# Patient Record
Sex: MALE | Race: AMERICAN INDIAN OR ALASKA NATIVE
[De-identification: names, ages, dates, MRNs, and addresses within clinical notes are randomized per-mention and may not be internally consistent; named-entity substitution may affect disease eponyms.]

---

## 2019-09-05 ENCOUNTER — HOSPITAL ENCOUNTER (EMERGENCY)
Dept: HOSPITAL 94 - ER | Age: 49
Discharge: HOME | End: 2019-09-05
Payer: MEDICAID

## 2019-09-05 VITALS — WEIGHT: 191.8 LBS | HEIGHT: 69 IN | BODY MASS INDEX: 28.41 KG/M2

## 2019-09-05 VITALS — DIASTOLIC BLOOD PRESSURE: 106 MMHG | SYSTOLIC BLOOD PRESSURE: 148 MMHG

## 2019-09-05 DIAGNOSIS — F17.200: ICD-10-CM

## 2019-09-05 DIAGNOSIS — Z79.4: ICD-10-CM

## 2019-09-05 DIAGNOSIS — M54.2: Primary | ICD-10-CM

## 2019-09-05 DIAGNOSIS — G89.29: ICD-10-CM

## 2019-09-05 DIAGNOSIS — Z88.8: ICD-10-CM

## 2019-09-05 DIAGNOSIS — Z88.1: ICD-10-CM

## 2019-09-05 DIAGNOSIS — E11.42: ICD-10-CM

## 2019-09-05 LAB
ALBUMIN SERPL BCP-MCNC: 3.6 G/DL (ref 3.4–5)
ALBUMIN/GLOB SERPL: 1.1 {RATIO} (ref 1.1–1.5)
ALP SERPL-CCNC: 74 IU/L (ref 46–116)
ALT SERPL W P-5'-P-CCNC: 68 U/L (ref 12–78)
ANION GAP SERPL CALCULATED.3IONS-SCNC: 9 MMOL/L (ref 8–16)
APTT PPP: 22 SECONDS (ref 22–32)
AST SERPL W P-5'-P-CCNC: 53 U/L (ref 10–37)
BASOPHILS # BLD AUTO: 0 X10'3 (ref 0–0.2)
BASOPHILS NFR BLD AUTO: 0.4 % (ref 0–1)
BILIRUB SERPL-MCNC: 0.1 MG/DL (ref 0.1–1)
BUN SERPL-MCNC: 11 MG/DL (ref 7–18)
BUN/CREAT SERPL: 10.4 (ref 5.4–32)
CALCIUM SERPL-MCNC: 8.5 MG/DL (ref 8.5–10.1)
CHLORIDE SERPL-SCNC: 105 MMOL/L (ref 99–107)
CO2 SERPL-SCNC: 27.2 MMOL/L (ref 24–32)
CREAT SERPL-MCNC: 1.06 MG/DL (ref 0.6–1.1)
EOSINOPHIL # BLD AUTO: 0.2 X10'3 (ref 0–0.9)
EOSINOPHIL NFR BLD AUTO: 3.5 % (ref 0–6)
ERYTHROCYTE [DISTWIDTH] IN BLOOD BY AUTOMATED COUNT: 16.4 % (ref 11.5–14.5)
GFR SERPL CREATININE-BSD FRML MDRD: 74 ML/MIN
GLUCOSE SERPL-MCNC: 126 MG/DL (ref 70–104)
HCT VFR BLD AUTO: 32.4 % (ref 42–52)
HGB BLD-MCNC: 10.8 G/DL (ref 14–17.9)
LYMPHOCYTES # BLD AUTO: 1.8 X10'3 (ref 1.1–4.8)
LYMPHOCYTES NFR BLD AUTO: 30.8 % (ref 21–51)
MCH RBC QN AUTO: 30.1 PG (ref 27–31)
MCHC RBC AUTO-ENTMCNC: 33.2 G/DL (ref 33–36.5)
MCV RBC AUTO: 90.6 FL (ref 78–98)
MONOCYTES # BLD AUTO: 0.8 X10'3 (ref 0–0.9)
MONOCYTES NFR BLD AUTO: 14 % (ref 2–12)
NEUTROPHILS # BLD AUTO: 3 X10'3 (ref 1.8–7.7)
NEUTROPHILS NFR BLD AUTO: 51.3 % (ref 42–75)
PLATELET # BLD AUTO: 154 X10'3 (ref 140–440)
PMV BLD AUTO: 8.6 FL (ref 7.4–10.4)
POTASSIUM SERPL-SCNC: 3.8 MMOL/L (ref 3.5–5.1)
PROT SERPL-MCNC: 7 G/DL (ref 6.4–8.2)
RBC # BLD AUTO: 3.57 X10'6 (ref 4.7–6.1)
SODIUM SERPL-SCNC: 141 MMOL/L (ref 135–145)
WBC # BLD AUTO: 5.9 X10'3 (ref 4.5–11)

## 2019-09-05 PROCEDURE — 96374 THER/PROPH/DIAG INJ IV PUSH: CPT

## 2019-09-05 PROCEDURE — 99284 EMERGENCY DEPT VISIT MOD MDM: CPT

## 2019-09-05 PROCEDURE — 96376 TX/PRO/DX INJ SAME DRUG ADON: CPT

## 2019-09-05 PROCEDURE — 85610 PROTHROMBIN TIME: CPT

## 2019-09-05 PROCEDURE — 85730 THROMBOPLASTIN TIME PARTIAL: CPT

## 2019-09-05 PROCEDURE — 83605 ASSAY OF LACTIC ACID: CPT

## 2019-09-05 PROCEDURE — 87040 BLOOD CULTURE FOR BACTERIA: CPT

## 2019-09-05 PROCEDURE — 93880 EXTRACRANIAL BILAT STUDY: CPT

## 2019-09-05 PROCEDURE — 80053 COMPREHEN METABOLIC PANEL: CPT

## 2019-09-05 PROCEDURE — 85025 COMPLETE CBC W/AUTO DIFF WBC: CPT

## 2019-09-05 PROCEDURE — 70491 CT SOFT TISSUE NECK W/DYE: CPT

## 2019-09-05 PROCEDURE — 96375 TX/PRO/DX INJ NEW DRUG ADDON: CPT

## 2019-09-05 PROCEDURE — 36415 COLL VENOUS BLD VENIPUNCTURE: CPT

## 2019-09-05 PROCEDURE — 70450 CT HEAD/BRAIN W/O DYE: CPT

## 2019-09-13 ENCOUNTER — HOSPITAL ENCOUNTER (EMERGENCY)
Dept: HOSPITAL 94 - ER | Age: 49
Discharge: HOME | End: 2019-09-13
Payer: MEDICAID

## 2019-09-13 VITALS — HEIGHT: 69 IN | BODY MASS INDEX: 28.94 KG/M2 | WEIGHT: 195.42 LBS

## 2019-09-13 VITALS — SYSTOLIC BLOOD PRESSURE: 157 MMHG | DIASTOLIC BLOOD PRESSURE: 103 MMHG

## 2019-09-13 DIAGNOSIS — M54.5: ICD-10-CM

## 2019-09-13 DIAGNOSIS — E11.42: ICD-10-CM

## 2019-09-13 DIAGNOSIS — Z88.1: ICD-10-CM

## 2019-09-13 DIAGNOSIS — G43.909: Primary | ICD-10-CM

## 2019-09-13 DIAGNOSIS — Z86.73: ICD-10-CM

## 2019-09-13 DIAGNOSIS — G89.29: ICD-10-CM

## 2019-09-13 DIAGNOSIS — Z88.8: ICD-10-CM

## 2019-09-13 LAB
ALBUMIN SERPL BCP-MCNC: 3.3 G/DL (ref 3.4–5)
ALBUMIN/GLOB SERPL: 1 {RATIO} (ref 1.1–1.5)
ALP SERPL-CCNC: 65 IU/L (ref 46–116)
ALT SERPL W P-5'-P-CCNC: 53 U/L (ref 12–78)
ANION GAP SERPL CALCULATED.3IONS-SCNC: 7 MMOL/L (ref 8–16)
AST SERPL W P-5'-P-CCNC: 23 U/L (ref 10–37)
BACTERIA URNS QL MICRO: (no result) /HPF
BASOPHILS # BLD AUTO: 0.1 X10'3 (ref 0–0.2)
BASOPHILS NFR BLD AUTO: 0.9 % (ref 0–1)
BILIRUB SERPL-MCNC: 0.2 MG/DL (ref 0.1–1)
BUN SERPL-MCNC: 13 MG/DL (ref 7–18)
BUN/CREAT SERPL: 14.1 (ref 5.4–32)
CALCIUM SERPL-MCNC: 8.5 MG/DL (ref 8.5–10.1)
CHLORIDE SERPL-SCNC: 107 MMOL/L (ref 99–107)
CLARITY UR: CLEAR
CO2 SERPL-SCNC: 25.9 MMOL/L (ref 24–32)
COLOR UR: YELLOW
CREAT SERPL-MCNC: 0.92 MG/DL (ref 0.6–1.1)
DEPRECATED SQUAMOUS URNS QL MICRO: (no result) /LPF
EOSINOPHIL # BLD AUTO: 0.2 X10'3 (ref 0–0.9)
EOSINOPHIL NFR BLD AUTO: 2.7 % (ref 0–6)
ERYTHROCYTE [DISTWIDTH] IN BLOOD BY AUTOMATED COUNT: 16.2 % (ref 11.5–14.5)
ETHANOL SERPL-MCNC: < 0.01 GM/DL (ref 0–0.01)
GFR SERPL CREATININE-BSD FRML MDRD: 87 ML/MIN
GLUCOSE SERPL-MCNC: 245 MG/DL (ref 70–104)
GLUCOSE UR STRIP-MCNC: >=1000 MG/DL
HCT VFR BLD AUTO: 31.1 % (ref 42–52)
HGB BLD-MCNC: 10.2 G/DL (ref 14–17.9)
HGB UR QL STRIP: NEGATIVE
KETONES UR STRIP-MCNC: NEGATIVE MG/DL
LEUKOCYTE ESTERASE UR QL STRIP: NEGATIVE
LYMPHOCYTES # BLD AUTO: 1.2 X10'3 (ref 1.1–4.8)
LYMPHOCYTES NFR BLD AUTO: 18 % (ref 21–51)
MAGNESIUM SERPL-MCNC: 1.7 MG/DL (ref 1.5–2.4)
MCH RBC QN AUTO: 29.6 PG (ref 27–31)
MCHC RBC AUTO-ENTMCNC: 32.9 G/DL (ref 33–36.5)
MCV RBC AUTO: 90 FL (ref 78–98)
MONOCYTES # BLD AUTO: 0.6 X10'3 (ref 0–0.9)
MONOCYTES NFR BLD AUTO: 9.8 % (ref 2–12)
MUCOUS THREADS URNS QL MICRO: (no result) /LPF
NEUTROPHILS # BLD AUTO: 4.5 X10'3 (ref 1.8–7.7)
NEUTROPHILS NFR BLD AUTO: 68.6 % (ref 42–75)
NITRITE UR QL STRIP: NEGATIVE
PH UR STRIP: 6 [PH] (ref 4.8–8)
PHOSPHATE SERPL-MCNC: 3.7 MG/DL (ref 2.3–4.5)
PLATELET # BLD AUTO: 313 X10'3 (ref 140–440)
PMV BLD AUTO: 8.2 FL (ref 7.4–10.4)
POTASSIUM SERPL-SCNC: 4.5 MMOL/L (ref 3.5–5.1)
PROT SERPL-MCNC: 6.5 G/DL (ref 6.4–8.2)
PROT UR QL STRIP: NEGATIVE MG/DL
RBC # BLD AUTO: 3.46 X10'6 (ref 4.7–6.1)
RBC #/AREA URNS HPF: (no result) /HPF (ref 0–2)
SODIUM SERPL-SCNC: 140 MMOL/L (ref 135–145)
SP GR UR STRIP: <=1.005 (ref 1–1.03)
URN COLLECT METHOD CLASS: (no result)
UROBILINOGEN UR STRIP-MCNC: 0.2 E.U/DL (ref 0.2–1)
WBC # BLD AUTO: 6.6 X10'3 (ref 4.5–11)
WBC #/AREA URNS HPF: (no result) /HPF (ref 0–4)

## 2019-09-13 PROCEDURE — 99284 EMERGENCY DEPT VISIT MOD MDM: CPT

## 2019-09-13 PROCEDURE — 80320 DRUG SCREEN QUANTALCOHOLS: CPT

## 2019-09-13 PROCEDURE — 82948 REAGENT STRIP/BLOOD GLUCOSE: CPT

## 2019-09-13 PROCEDURE — 85025 COMPLETE CBC W/AUTO DIFF WBC: CPT

## 2019-09-13 PROCEDURE — 81001 URINALYSIS AUTO W/SCOPE: CPT

## 2019-09-13 PROCEDURE — 83735 ASSAY OF MAGNESIUM: CPT

## 2019-09-13 PROCEDURE — 84100 ASSAY OF PHOSPHORUS: CPT

## 2019-09-13 PROCEDURE — 71045 X-RAY EXAM CHEST 1 VIEW: CPT

## 2019-09-13 PROCEDURE — 84484 ASSAY OF TROPONIN QUANT: CPT

## 2019-09-13 PROCEDURE — 70450 CT HEAD/BRAIN W/O DYE: CPT

## 2019-09-13 PROCEDURE — 36415 COLL VENOUS BLD VENIPUNCTURE: CPT

## 2019-09-13 PROCEDURE — 85610 PROTHROMBIN TIME: CPT

## 2019-09-13 PROCEDURE — 96375 TX/PRO/DX INJ NEW DRUG ADDON: CPT

## 2019-09-13 PROCEDURE — 93005 ELECTROCARDIOGRAM TRACING: CPT

## 2019-09-13 PROCEDURE — 80053 COMPREHEN METABOLIC PANEL: CPT

## 2019-09-13 PROCEDURE — 96374 THER/PROPH/DIAG INJ IV PUSH: CPT

## 2019-09-18 ENCOUNTER — HOSPITAL ENCOUNTER (EMERGENCY)
Dept: HOSPITAL 94 - ER | Age: 49
Discharge: HOME | End: 2019-09-18
Payer: MEDICAID

## 2019-09-18 VITALS — HEIGHT: 69 IN | WEIGHT: 195.42 LBS | BODY MASS INDEX: 28.94 KG/M2

## 2019-09-18 VITALS — SYSTOLIC BLOOD PRESSURE: 169 MMHG | DIASTOLIC BLOOD PRESSURE: 86 MMHG

## 2019-09-18 DIAGNOSIS — Z79.899: ICD-10-CM

## 2019-09-18 DIAGNOSIS — Z88.1: ICD-10-CM

## 2019-09-18 DIAGNOSIS — G89.29: Primary | ICD-10-CM

## 2019-09-18 DIAGNOSIS — G62.9: ICD-10-CM

## 2019-09-18 DIAGNOSIS — Z86.73: ICD-10-CM

## 2019-09-18 DIAGNOSIS — M54.2: ICD-10-CM

## 2019-09-18 DIAGNOSIS — E10.65: ICD-10-CM

## 2019-09-18 DIAGNOSIS — Z79.4: ICD-10-CM

## 2019-09-18 LAB
ALBUMIN SERPL BCP-MCNC: 3.2 G/DL (ref 3.4–5)
ALBUMIN/GLOB SERPL: 1 {RATIO} (ref 1.1–1.5)
ALP SERPL-CCNC: 90 IU/L (ref 46–116)
ALT SERPL W P-5'-P-CCNC: 60 U/L (ref 12–78)
ANION GAP SERPL CALCULATED.3IONS-SCNC: 6 MMOL/L (ref 8–16)
AST SERPL W P-5'-P-CCNC: 23 U/L (ref 10–37)
BACTERIA URNS QL MICRO: (no result) /HPF
BASE EXCESS BLDA CALC-SCNC: 1.3 MMOL/L (ref -2–3)
BASOPHILS # BLD AUTO: 0 X10'3 (ref 0–0.2)
BASOPHILS NFR BLD AUTO: 0.2 % (ref 0–1)
BILIRUB SERPL-MCNC: 0.2 MG/DL (ref 0.1–1)
BODY TEMPERATURE: 38.2
BUN SERPL-MCNC: 13 MG/DL (ref 7–18)
BUN/CREAT SERPL: 11.1 (ref 5.4–32)
CALCIUM SERPL-MCNC: 8.2 MG/DL (ref 8.5–10.1)
CHLORIDE SERPL-SCNC: 102 MMOL/L (ref 99–107)
CLARITY UR: CLEAR
CO2 SERPL-SCNC: 29 MMOL/L (ref 24–32)
COHGB MFR BLDA: 1.5 % (ref 0.5–1.5)
COLOR UR: YELLOW
CREAT SERPL-MCNC: 1.17 MG/DL (ref 0.6–1.1)
DEPRECATED SQUAMOUS URNS QL MICRO: (no result) /LPF
EOSINOPHIL # BLD AUTO: 0.4 X10'3 (ref 0–0.9)
EOSINOPHIL NFR BLD AUTO: 3.3 % (ref 0–6)
ERYTHROCYTE [DISTWIDTH] IN BLOOD BY AUTOMATED COUNT: 15.8 % (ref 11.5–14.5)
GFR SERPL CREATININE-BSD FRML MDRD: 66 ML/MIN
GLUCOSE SERPL-MCNC: 359 MG/DL (ref 70–104)
GLUCOSE UR STRIP-MCNC: >=1000 MG/DL
HCO3 BLDA-SCNC: 25.2 MMOL/L (ref 22–26)
HCT VFR BLD AUTO: 30 % (ref 42–52)
HGB BLD-MCNC: 9.9 G/DL (ref 14–17.9)
HGB BLDA-MCNC: 10.6 G/DL (ref 14–17.9)
HGB UR QL STRIP: NEGATIVE
KETONES UR STRIP-MCNC: NEGATIVE MG/DL
LEUKOCYTE ESTERASE UR QL STRIP: NEGATIVE
LYMPHOCYTES # BLD AUTO: 1.2 X10'3 (ref 1.1–4.8)
LYMPHOCYTES NFR BLD AUTO: 11 % (ref 21–51)
MAGNESIUM SERPL-MCNC: 1.8 MG/DL (ref 1.5–2.4)
MCH RBC QN AUTO: 29.3 PG (ref 27–31)
MCHC RBC AUTO-ENTMCNC: 32.8 G/DL (ref 33–36.5)
MCV RBC AUTO: 89.1 FL (ref 78–98)
METHGB MFR BLDA: 0.2 % (ref 0.3–1.12)
MONOCYTES # BLD AUTO: 1.2 X10'3 (ref 0–0.9)
MONOCYTES NFR BLD AUTO: 11.3 % (ref 2–12)
MUCOUS THREADS URNS QL MICRO: (no result) /LPF
NEUTROPHILS # BLD AUTO: 8.1 X10'3 (ref 1.8–7.7)
NEUTROPHILS NFR BLD AUTO: 74.2 % (ref 42–75)
NITRITE UR QL STRIP: NEGATIVE
O2/TOTAL GAS SETTING VFR VENT: 21 MMHG/%
OXYHGB MFR BLDA: 93 % (ref 94–100)
PCO2 TEMP ADJ BLDA: 39.3 MMHG (ref 35–45)
PH TEMP ADJ BLDA: 7.43 [PH] (ref 7.35–7.45)
PH UR STRIP: 7 [PH] (ref 4.8–8)
PLATELET # BLD AUTO: 279 X10'3 (ref 140–440)
PMV BLD AUTO: 8.6 FL (ref 7.4–10.4)
PO2 TEMP ADJ BLD: 81.7 MMHG (ref 83–108)
POTASSIUM SERPL-SCNC: 3.9 MMOL/L (ref 3.5–5.1)
PROT SERPL-MCNC: 6.5 G/DL (ref 6.4–8.2)
PROT UR QL STRIP: NEGATIVE MG/DL
RBC # BLD AUTO: 3.37 X10'6 (ref 4.7–6.1)
RBC #/AREA URNS HPF: (no result) /HPF (ref 0–2)
SAO2 % BLDA: 94.6 % (ref 95–98)
SODIUM SERPL-SCNC: 137 MMOL/L (ref 135–145)
SP GR UR STRIP: <=1.005 (ref 1–1.03)
URN COLLECT METHOD CLASS: (no result)
UROBILINOGEN UR STRIP-MCNC: 0.2 E.U/DL (ref 0.2–1)
WBC # BLD AUTO: 10.9 X10'3 (ref 4.5–11)
WBC #/AREA URNS HPF: (no result) /HPF (ref 0–4)

## 2019-09-18 PROCEDURE — 82803 BLOOD GASES ANY COMBINATION: CPT

## 2019-09-18 PROCEDURE — 84145 PROCALCITONIN (PCT): CPT

## 2019-09-18 PROCEDURE — 83735 ASSAY OF MAGNESIUM: CPT

## 2019-09-18 PROCEDURE — 83605 ASSAY OF LACTIC ACID: CPT

## 2019-09-18 PROCEDURE — 99284 EMERGENCY DEPT VISIT MOD MDM: CPT

## 2019-09-18 PROCEDURE — 93005 ELECTROCARDIOGRAM TRACING: CPT

## 2019-09-18 PROCEDURE — 71045 X-RAY EXAM CHEST 1 VIEW: CPT

## 2019-09-18 PROCEDURE — 87040 BLOOD CULTURE FOR BACTERIA: CPT

## 2019-09-18 PROCEDURE — 36600 WITHDRAWAL OF ARTERIAL BLOOD: CPT

## 2019-09-18 PROCEDURE — 85018 HEMOGLOBIN: CPT

## 2019-09-18 PROCEDURE — 81001 URINALYSIS AUTO W/SCOPE: CPT

## 2019-09-18 PROCEDURE — 36415 COLL VENOUS BLD VENIPUNCTURE: CPT

## 2019-09-18 PROCEDURE — 85025 COMPLETE CBC W/AUTO DIFF WBC: CPT

## 2019-09-18 PROCEDURE — 80053 COMPREHEN METABOLIC PANEL: CPT

## 2019-09-18 PROCEDURE — 82948 REAGENT STRIP/BLOOD GLUCOSE: CPT

## 2019-09-18 NOTE — NUR
PT BECAME AGITATED AFTER TALKING WITH MD URIARTE. PT BEGAN YELLING AND 
DEMANDING "TAKE THIS IV OUT NOW. I'M LEAVING. I'VE NEVER BEEN SO DISRESPECTED 
IN MY LIFE." PT THEN PULLED IV OUT FROM HAND ON HIS OWN. SECURITY CALLED AND 
STANDBY AT BEDSIDE. PT DEMANDED BLOOD SUGAR BE TAKEN BEFORE HE "WALKS OUT." BS 
280 AND MD AWARE. PT AWARE AND INSISTED ON LEAVING AMA.

## 2019-09-19 ENCOUNTER — HOSPITAL ENCOUNTER (INPATIENT)
Dept: HOSPITAL 94 - ER | Age: 49
LOS: 3 days | Discharge: HOME | DRG: 721 | End: 2019-09-22
Attending: INTERNAL MEDICINE | Admitting: INTERNAL MEDICINE
Payer: MEDICAID

## 2019-09-19 VITALS — BODY MASS INDEX: 28.94 KG/M2 | WEIGHT: 195.42 LBS | HEIGHT: 69 IN

## 2019-09-19 VITALS — DIASTOLIC BLOOD PRESSURE: 77 MMHG | SYSTOLIC BLOOD PRESSURE: 123 MMHG

## 2019-09-19 DIAGNOSIS — Z86.73: ICD-10-CM

## 2019-09-19 DIAGNOSIS — I69.951: ICD-10-CM

## 2019-09-19 DIAGNOSIS — E78.5: ICD-10-CM

## 2019-09-19 DIAGNOSIS — M54.9: ICD-10-CM

## 2019-09-19 DIAGNOSIS — Z79.4: ICD-10-CM
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
## 2019-09-19 DIAGNOSIS — G89.29: ICD-10-CM

## 2019-09-19 DIAGNOSIS — F17.290: ICD-10-CM

## 2019-09-19 DIAGNOSIS — I10: ICD-10-CM

## 2019-09-19 DIAGNOSIS — E11.65: ICD-10-CM

## 2019-09-19 DIAGNOSIS — T81.41XA: Primary | ICD-10-CM

## 2019-09-19 DIAGNOSIS — R13.10: ICD-10-CM

## 2019-09-19 DIAGNOSIS — Z88.0: ICD-10-CM

## 2019-09-19 DIAGNOSIS — E11.42: ICD-10-CM

## 2019-09-19 DIAGNOSIS — G40.909: ICD-10-CM
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
## 2019-09-19 DIAGNOSIS — Z88.8: ICD-10-CM

## 2019-09-19 DIAGNOSIS — L03.221: ICD-10-CM

## 2019-09-19 LAB
ALBUMIN SERPL BCP-MCNC: 3.1 G/DL (ref 3.4–5)
ALBUMIN/GLOB SERPL: 0.9 {RATIO} (ref 1.1–1.5)
ALP SERPL-CCNC: 83 IU/L (ref 46–116)
ALT SERPL W P-5'-P-CCNC: 52 U/L (ref 12–78)
ANION GAP SERPL CALCULATED.3IONS-SCNC: 9 MMOL/L (ref 8–16)
AST SERPL W P-5'-P-CCNC: 14 U/L (ref 10–37)
BASOPHILS # BLD AUTO: 0 X10'3 (ref 0–0.2)
BASOPHILS NFR BLD AUTO: 0.3 % (ref 0–1)
BILIRUB SERPL-MCNC: 0.2 MG/DL (ref 0.1–1)
BUN SERPL-MCNC: 13 MG/DL (ref 7–18)
BUN/CREAT SERPL: 12.4 (ref 5.4–32)
CALCIUM SERPL-MCNC: 8.2 MG/DL (ref 8.5–10.1)
CHLORIDE SERPL-SCNC: 104 MMOL/L (ref 99–107)
CO2 SERPL-SCNC: 25.9 MMOL/L (ref 24–32)
CREAT SERPL-MCNC: 1.05 MG/DL (ref 0.6–1.1)
EOSINOPHIL # BLD AUTO: 0.3 X10'3 (ref 0–0.9)
EOSINOPHIL NFR BLD AUTO: 3.6 % (ref 0–6)
ERYTHROCYTE [DISTWIDTH] IN BLOOD BY AUTOMATED COUNT: 16.2 % (ref 11.5–14.5)
GFR SERPL CREATININE-BSD FRML MDRD: 74 ML/MIN
GLUCOSE SERPL-MCNC: 346 MG/DL (ref 70–104)
HBA1C MFR BLD: 8.8 % (ref 4.5–6.2)
HCT VFR BLD AUTO: 29.8 % (ref 42–52)
HGB BLD-MCNC: 9.7 G/DL (ref 14–17.9)
LYMPHOCYTES # BLD AUTO: 1.2 X10'3 (ref 1.1–4.8)
LYMPHOCYTES NFR BLD AUTO: 12.9 % (ref 21–51)
MAGNESIUM SERPL-MCNC: 1.8 MG/DL (ref 1.5–2.4)
MCH RBC QN AUTO: 29.1 PG (ref 27–31)
MCHC RBC AUTO-ENTMCNC: 32.6 G/DL (ref 33–36.5)
MCV RBC AUTO: 89.3 FL (ref 78–98)
MONOCYTES # BLD AUTO: 1.1 X10'3 (ref 0–0.9)
MONOCYTES NFR BLD AUTO: 12.4 % (ref 2–12)
NEUTROPHILS # BLD AUTO: 6.4 X10'3 (ref 1.8–7.7)
NEUTROPHILS NFR BLD AUTO: 70.8 % (ref 42–75)
PLATELET # BLD AUTO: 275 X10'3 (ref 140–440)
PMV BLD AUTO: 8.4 FL (ref 7.4–10.4)
POTASSIUM SERPL-SCNC: 4.1 MMOL/L (ref 3.5–5.1)
PROT SERPL-MCNC: 6.5 G/DL (ref 6.4–8.2)
RBC # BLD AUTO: 3.34 X10'6 (ref 4.7–6.1)
SODIUM SERPL-SCNC: 139 MMOL/L (ref 135–145)
WBC # BLD AUTO: 9 X10'3 (ref 4.5–11)

## 2019-09-19 PROCEDURE — 94640 AIRWAY INHALATION TREATMENT: CPT

## 2019-09-19 PROCEDURE — 83605 ASSAY OF LACTIC ACID: CPT

## 2019-09-19 PROCEDURE — 99285 EMERGENCY DEPT VISIT HI MDM: CPT

## 2019-09-19 PROCEDURE — 97116 GAIT TRAINING THERAPY: CPT

## 2019-09-19 PROCEDURE — 80053 COMPREHEN METABOLIC PANEL: CPT

## 2019-09-19 PROCEDURE — 96365 THER/PROPH/DIAG IV INF INIT: CPT

## 2019-09-19 PROCEDURE — 84145 PROCALCITONIN (PCT): CPT

## 2019-09-19 PROCEDURE — 96361 HYDRATE IV INFUSION ADD-ON: CPT

## 2019-09-19 PROCEDURE — 80202 ASSAY OF VANCOMYCIN: CPT

## 2019-09-19 PROCEDURE — 71045 X-RAY EXAM CHEST 1 VIEW: CPT

## 2019-09-19 PROCEDURE — 97161 PT EVAL LOW COMPLEX 20 MIN: CPT

## 2019-09-19 PROCEDURE — 85025 COMPLETE CBC W/AUTO DIFF WBC: CPT

## 2019-09-19 PROCEDURE — 83735 ASSAY OF MAGNESIUM: CPT

## 2019-09-19 PROCEDURE — 70498 CT ANGIOGRAPHY NECK: CPT

## 2019-09-19 PROCEDURE — 80048 BASIC METABOLIC PNL TOTAL CA: CPT

## 2019-09-19 PROCEDURE — 83036 HEMOGLOBIN GLYCOSYLATED A1C: CPT

## 2019-09-19 PROCEDURE — 87040 BLOOD CULTURE FOR BACTERIA: CPT

## 2019-09-19 PROCEDURE — 36415 COLL VENOUS BLD VENIPUNCTURE: CPT

## 2019-09-19 PROCEDURE — 97530 THERAPEUTIC ACTIVITIES: CPT

## 2019-09-19 PROCEDURE — 87081 CULTURE SCREEN ONLY: CPT

## 2019-09-19 PROCEDURE — 82948 REAGENT STRIP/BLOOD GLUCOSE: CPT

## 2019-09-19 PROCEDURE — 96372 THER/PROPH/DIAG INJ SC/IM: CPT

## 2019-09-19 RX ADMIN — SODIUM CHLORIDE SCH MLS/HR: 9 INJECTION INTRAMUSCULAR; INTRAVENOUS; SUBCUTANEOUS at 20:05

## 2019-09-19 RX ADMIN — SODIUM CHLORIDE SCH MLS/HR: 9 INJECTION INTRAMUSCULAR; INTRAVENOUS; SUBCUTANEOUS at 21:05

## 2019-09-19 RX ADMIN — Medication SCH EACH: at 15:00

## 2019-09-19 RX ADMIN — CEFTAZIDIME SCH MLS/HR: 2 INJECTION, POWDER, FOR SOLUTION INTRAVENOUS at 14:57

## 2019-09-19 RX ADMIN — HYDROCODONE BITARTRATE AND ACETAMINOPHEN PRN TAB: 5; 325 TABLET ORAL at 23:20

## 2019-09-19 RX ADMIN — MORPHINE SULFATE PRN MG: 4 INJECTION, SOLUTION INTRAMUSCULAR; INTRAVENOUS at 13:55

## 2019-09-19 RX ADMIN — INSULIN GLARGINE SCH UNIT: 100 INJECTION, SOLUTION SUBCUTANEOUS at 21:00

## 2019-09-19 RX ADMIN — MORPHINE SULFATE PRN MG: 4 INJECTION, SOLUTION INTRAMUSCULAR; INTRAVENOUS at 12:33

## 2019-09-19 NOTE — NUR
MD ORDERED MECHANICAL SOFT, CCHO, HEART HEALTHY DIET.  PREPRANDIAL BLOOD 
GLUCOSE 67.  PT EATING DINNER AT THIS TIME.

## 2019-09-19 NOTE — NUR
pt keeps removing vital sign equipment and disconnecting iv. pt has been 
educated multiple times but continues to remove equipment

## 2019-09-20 VITALS — SYSTOLIC BLOOD PRESSURE: 131 MMHG | DIASTOLIC BLOOD PRESSURE: 80 MMHG

## 2019-09-20 VITALS — SYSTOLIC BLOOD PRESSURE: 166 MMHG | DIASTOLIC BLOOD PRESSURE: 86 MMHG

## 2019-09-20 VITALS — SYSTOLIC BLOOD PRESSURE: 142 MMHG | DIASTOLIC BLOOD PRESSURE: 79 MMHG

## 2019-09-20 VITALS — SYSTOLIC BLOOD PRESSURE: 139 MMHG | DIASTOLIC BLOOD PRESSURE: 90 MMHG

## 2019-09-20 VITALS — SYSTOLIC BLOOD PRESSURE: 167 MMHG | DIASTOLIC BLOOD PRESSURE: 94 MMHG

## 2019-09-20 LAB
ALBUMIN SERPL BCP-MCNC: 3 G/DL (ref 3.4–5)
ANION GAP SERPL CALCULATED.3IONS-SCNC: 8 MMOL/L (ref 8–16)
BASOPHILS # BLD AUTO: 0 X10'3 (ref 0–0.2)
BASOPHILS NFR BLD AUTO: 0.4 % (ref 0–1)
BUN SERPL-MCNC: 10 MG/DL (ref 7–18)
BUN/CREAT SERPL: 10.6 (ref 5.4–32)
CALCIUM SERPL-MCNC: 8.3 MG/DL (ref 8.5–10.1)
CHLORIDE SERPL-SCNC: 106 MMOL/L (ref 99–107)
CO2 SERPL-SCNC: 26.8 MMOL/L (ref 24–32)
CREAT SERPL-MCNC: 0.94 MG/DL (ref 0.6–1.1)
EOSINOPHIL # BLD AUTO: 0.5 X10'3 (ref 0–0.9)
EOSINOPHIL NFR BLD AUTO: 4.1 % (ref 0–6)
ERYTHROCYTE [DISTWIDTH] IN BLOOD BY AUTOMATED COUNT: 16.3 % (ref 11.5–14.5)
GFR SERPL CREATININE-BSD FRML MDRD: 85 ML/MIN
GLUCOSE SERPL-MCNC: 156 MG/DL (ref 70–104)
HCT VFR BLD AUTO: 32.2 % (ref 42–52)
HGB BLD-MCNC: 10.5 G/DL (ref 14–17.9)
LYMPHOCYTES # BLD AUTO: 1.2 X10'3 (ref 1.1–4.8)
LYMPHOCYTES NFR BLD AUTO: 11.2 % (ref 21–51)
MAGNESIUM SERPL-MCNC: 1.9 MG/DL (ref 1.5–2.4)
MCH RBC QN AUTO: 28.9 PG (ref 27–31)
MCHC RBC AUTO-ENTMCNC: 32.5 G/DL (ref 33–36.5)
MCV RBC AUTO: 88.8 FL (ref 78–98)
MONOCYTES # BLD AUTO: 1.3 X10'3 (ref 0–0.9)
MONOCYTES NFR BLD AUTO: 11.5 % (ref 2–12)
NEUTROPHILS # BLD AUTO: 8.1 X10'3 (ref 1.8–7.7)
NEUTROPHILS NFR BLD AUTO: 72.8 % (ref 42–75)
PLATELET # BLD AUTO: 278 X10'3 (ref 140–440)
PMV BLD AUTO: 8.6 FL (ref 7.4–10.4)
POTASSIUM SERPL-SCNC: 3.7 MMOL/L (ref 3.5–5.1)
RBC # BLD AUTO: 3.63 X10'6 (ref 4.7–6.1)
SODIUM SERPL-SCNC: 141 MMOL/L (ref 135–145)
WBC # BLD AUTO: 11.1 X10'3 (ref 4.5–11)

## 2019-09-20 RX ADMIN — BUTALBITAL, ACETAMINOPHEN, AND CAFFEINE PRN TAB: 50; 325; 40 TABLET ORAL at 12:51

## 2019-09-20 RX ADMIN — Medication SCH EACH: at 07:32

## 2019-09-20 RX ADMIN — INSULIN GLARGINE SCH UNIT: 100 INJECTION, SOLUTION SUBCUTANEOUS at 21:30

## 2019-09-20 RX ADMIN — Medication SCH MMU: at 07:31

## 2019-09-20 RX ADMIN — PANTOPRAZOLE SODIUM SCH MG: 40 TABLET, DELAYED RELEASE ORAL at 07:30

## 2019-09-20 RX ADMIN — BUTALBITAL, ACETAMINOPHEN, AND CAFFEINE PRN TAB: 50; 325; 40 TABLET ORAL at 20:29

## 2019-09-20 RX ADMIN — Medication SCH MMU: at 20:29

## 2019-09-20 RX ADMIN — CEFTAZIDIME SCH MLS/HR: 2 INJECTION, POWDER, FOR SOLUTION INTRAVENOUS at 03:10

## 2019-09-20 RX ADMIN — VANCOMYCIN HYDROCHLORIDE SCH MLS/HR: 1.25 INJECTION, POWDER, LYOPHILIZED, FOR SOLUTION INTRAVENOUS at 21:09

## 2019-09-20 RX ADMIN — HEPARIN SODIUM SCH UNIT: 5000 INJECTION, SOLUTION INTRAVENOUS; SUBCUTANEOUS at 20:30

## 2019-09-20 RX ADMIN — VANCOMYCIN HYDROCHLORIDE SCH MLS/HR: 1.25 INJECTION, POWDER, LYOPHILIZED, FOR SOLUTION INTRAVENOUS at 12:50

## 2019-09-20 RX ADMIN — CEFTAZIDIME SCH MLS/HR: 2 INJECTION, POWDER, FOR SOLUTION INTRAVENOUS at 17:44

## 2019-09-20 RX ADMIN — LEVOFLOXACIN SCH MLS/HR: 500 INJECTION, SOLUTION INTRAVENOUS at 07:27

## 2019-09-20 RX ADMIN — HYDROCODONE BITARTRATE AND ACETAMINOPHEN PRN TAB: 5; 325 TABLET ORAL at 13:51

## 2019-09-20 RX ADMIN — HYDROCODONE BITARTRATE AND ACETAMINOPHEN PRN TAB: 5; 325 TABLET ORAL at 07:28

## 2019-09-20 RX ADMIN — HYDROCODONE BITARTRATE AND ACETAMINOPHEN PRN TAB: 5; 325 TABLET ORAL at 17:45

## 2019-09-20 NOTE — NUR
PT. DISGRUNTLED ABOUT MANY THINGS CONSTANTLY. C/O IV MAKING A BEEPING NOISE WHILE THIS NURSE 
WAS CARING FOR ANOTHER PT. AND STATED HE TURNED IT OFF. EDUCATED ABOUT NOT ALTERING MEDICAL 
EQUIPMENT. DISGRUNTLED THAT PAIN MEDICATIONS HAVE NOT BEEN ORDERED WITH GREATER FREQUENCY. 
C/O IV BEING "TIGHT". ASSESSED IV. NO S/SX INFILTRATION OR INFECTION. NOT OUTDATED. 
ATTEMPTED TO PLACE PIV IN RA PER PT. REQUEST. UNABLE TO ACESS PIPER. PICC NURSE PAGED BY 
CHARGE. WILL CONT. TO ROUND FREQUENTLY ON PT.

## 2019-09-20 NOTE — NUR
MD MADE AWARE THAT PT. WOULD LIKE MORE PAIN MEDICATION. NO NEW ORDERS AT THIS TIME. WENT 
INTO PT.'S ROOM TO REASSESS TEMPERATURE. PT. REFUSED STATING "I'M ON THE PHONE."

## 2019-09-20 NOTE — NUR
PAGER ID:  [phone] 

MESSAGE:  JOSE LUIS MELENDEZ 353 PT. AT 89-90% SA02 ON RA. O2 AT 3LPM GIVEN VIA N/C PT AT 94%. LS 
WHEEZES THROUGHOUT. KIMBER[place] [id number]

## 2019-09-20 NOTE — NUR
PT. IN ROOM RESTING, RISE AND FALL OF CHEST, 02 AT 1 LPM, COMFORTABLE AT THIS TIME. Problems 
reprioritized. Patient report given, questions answered & plan of care reviewed with VICENTE FAJARDO. 

-------------------------------------------------------------------------------

Addendum: 09/20/19 at 1824 by Melody Sanches RN

-------------------------------------------------------------------------------

WRONG PT. ABOVE. PT. IN ROOM WITH NO EMERGENT NEEDS AT THIS TIME. RIPPED NEWLY PLACED IV OUT 
OF ARM. BLEEDING, VICENTE FAJARDO AWARE AND HANDLING ISSUE. Problems reprioritized. Patient 
report given, questions answered & plan of care reviewed with VICENTE FAJARDO.

## 2019-09-20 NOTE — NUR
DM consult, patient has A1c of 8.8, seen at bedside and given written DM 

education handout 

and referral to outpatient DM education class on 

Tuesday morning, patient declined verbal review. Per MD note, patient 

presents to ED with left sided neck 

pain s/p left carotid endarterectomy six weeks ago, also with c/o taking a 

long time to swallow and needing to drink liquids when he swallows, stays 

at [facility]. 

Patient endorsed a fair appetite, declined to have his food chopped or ground. Encouraged 
patient to eat protein.  Had no questions at this time.

Recommend: 

1. continue Carb controlled, heart healthy, mechanical soft diet

2. weight per rx


-------------------------------------------------------------------------------

Addendum: 09/20/19 at 1608 by Danita Noel RD

-------------------------------------------------------------------------------

Amended: Links added.

## 2019-09-20 NOTE — NUR
Problems reprioritized. Patient report given, questions answered & plan of care reviewed 
with Melody FAJARDO.

## 2019-09-21 VITALS — DIASTOLIC BLOOD PRESSURE: 83 MMHG | SYSTOLIC BLOOD PRESSURE: 145 MMHG

## 2019-09-21 VITALS — DIASTOLIC BLOOD PRESSURE: 66 MMHG | SYSTOLIC BLOOD PRESSURE: 124 MMHG

## 2019-09-21 VITALS — SYSTOLIC BLOOD PRESSURE: 119 MMHG | DIASTOLIC BLOOD PRESSURE: 82 MMHG

## 2019-09-21 VITALS — SYSTOLIC BLOOD PRESSURE: 147 MMHG | DIASTOLIC BLOOD PRESSURE: 90 MMHG

## 2019-09-21 LAB
ALBUMIN SERPL BCP-MCNC: 2.6 G/DL (ref 3.4–5)
ANION GAP SERPL CALCULATED.3IONS-SCNC: 8 MMOL/L (ref 8–16)
BASOPHILS # BLD AUTO: 0.1 X10'3 (ref 0–0.2)
BASOPHILS NFR BLD AUTO: 0.6 % (ref 0–1)
BUN SERPL-MCNC: 11 MG/DL (ref 7–18)
BUN/CREAT SERPL: 13.4 (ref 5.4–32)
CALCIUM SERPL-MCNC: 8 MG/DL (ref 8.5–10.1)
CHLORIDE SERPL-SCNC: 102 MMOL/L (ref 99–107)
CO2 SERPL-SCNC: 25.1 MMOL/L (ref 24–32)
CREAT SERPL-MCNC: 0.82 MG/DL (ref 0.6–1.1)
EOSINOPHIL # BLD AUTO: 0.3 X10'3 (ref 0–0.9)
EOSINOPHIL NFR BLD AUTO: 2.9 % (ref 0–6)
ERYTHROCYTE [DISTWIDTH] IN BLOOD BY AUTOMATED COUNT: 15.7 % (ref 11.5–14.5)
GFR SERPL CREATININE-BSD FRML MDRD: > 90 ML/MIN
GLUCOSE SERPL-MCNC: 322 MG/DL (ref 70–104)
HCT VFR BLD AUTO: 28.8 % (ref 42–52)
HGB BLD-MCNC: 9.7 G/DL (ref 14–17.9)
LYMPHOCYTES # BLD AUTO: 1.2 X10'3 (ref 1.1–4.8)
LYMPHOCYTES NFR BLD AUTO: 12.4 % (ref 21–51)
MAGNESIUM SERPL-MCNC: 1.8 MG/DL (ref 1.5–2.4)
MCH RBC QN AUTO: 29.2 PG (ref 27–31)
MCHC RBC AUTO-ENTMCNC: 33.5 G/DL (ref 33–36.5)
MCV RBC AUTO: 87 FL (ref 78–98)
MONOCYTES # BLD AUTO: 1.2 X10'3 (ref 0–0.9)
MONOCYTES NFR BLD AUTO: 13.2 % (ref 2–12)
NEUTROPHILS # BLD AUTO: 6.7 X10'3 (ref 1.8–7.7)
NEUTROPHILS NFR BLD AUTO: 70.9 % (ref 42–75)
PLATELET # BLD AUTO: 241 X10'3 (ref 140–440)
PMV BLD AUTO: 9.3 FL (ref 7.4–10.4)
POTASSIUM SERPL-SCNC: 4.1 MMOL/L (ref 3.5–5.1)
RBC # BLD AUTO: 3.31 X10'6 (ref 4.7–6.1)
SODIUM SERPL-SCNC: 135 MMOL/L (ref 135–145)
VANCOMYCIN SERPL-MCNC: 12 UG/ML (ref 6–14)
WBC # BLD AUTO: 9.4 X10'3 (ref 4.5–11)

## 2019-09-21 RX ADMIN — VANCOMYCIN HYDROCHLORIDE SCH MLS/HR: 1.25 INJECTION, POWDER, LYOPHILIZED, FOR SOLUTION INTRAVENOUS at 21:17

## 2019-09-21 RX ADMIN — HYDROCODONE BITARTRATE AND ACETAMINOPHEN PRN TAB: 5; 325 TABLET ORAL at 18:50

## 2019-09-21 RX ADMIN — BUTALBITAL, ACETAMINOPHEN, AND CAFFEINE PRN TAB: 50; 325; 40 TABLET ORAL at 05:00

## 2019-09-21 RX ADMIN — BUTALBITAL, ACETAMINOPHEN, AND CAFFEINE PRN TAB: 50; 325; 40 TABLET ORAL at 21:18

## 2019-09-21 RX ADMIN — CEFTAZIDIME SCH MLS/HR: 2 INJECTION, POWDER, FOR SOLUTION INTRAVENOUS at 03:00

## 2019-09-21 RX ADMIN — INSULIN LISPRO SCH UNITS: 100 INJECTION, SOLUTION INTRAVENOUS; SUBCUTANEOUS at 10:41

## 2019-09-21 RX ADMIN — HEPARIN SODIUM SCH UNIT: 5000 INJECTION, SOLUTION INTRAVENOUS; SUBCUTANEOUS at 08:56

## 2019-09-21 RX ADMIN — LEVOFLOXACIN SCH MLS/HR: 500 INJECTION, SOLUTION INTRAVENOUS at 08:56

## 2019-09-21 RX ADMIN — HYDROCODONE BITARTRATE AND ACETAMINOPHEN PRN TAB: 5; 325 TABLET ORAL at 11:09

## 2019-09-21 RX ADMIN — Medication SCH EACH: at 07:38

## 2019-09-21 RX ADMIN — VANCOMYCIN HYDROCHLORIDE SCH MLS/HR: 1.25 INJECTION, POWDER, LYOPHILIZED, FOR SOLUTION INTRAVENOUS at 13:06

## 2019-09-21 RX ADMIN — Medication SCH MMU: at 20:42

## 2019-09-21 RX ADMIN — HYDROCODONE BITARTRATE AND ACETAMINOPHEN PRN TAB: 5; 325 TABLET ORAL at 05:00

## 2019-09-21 RX ADMIN — INSULIN GLARGINE SCH UNIT: 100 INJECTION, SOLUTION SUBCUTANEOUS at 21:16

## 2019-09-21 RX ADMIN — SODIUM CHLORIDE SCH MLS/HR: 9 INJECTION INTRAMUSCULAR; INTRAVENOUS; SUBCUTANEOUS at 18:50

## 2019-09-21 RX ADMIN — PANTOPRAZOLE SODIUM SCH MG: 40 TABLET, DELAYED RELEASE ORAL at 09:00

## 2019-09-21 RX ADMIN — INSULIN LISPRO SCH UNITS: 100 INJECTION, SOLUTION INTRAVENOUS; SUBCUTANEOUS at 18:44

## 2019-09-21 RX ADMIN — INSULIN LISPRO SCH UNITS: 100 INJECTION, SOLUTION INTRAVENOUS; SUBCUTANEOUS at 13:26

## 2019-09-21 RX ADMIN — Medication SCH MMU: at 08:56

## 2019-09-21 RX ADMIN — BUTALBITAL, ACETAMINOPHEN, AND CAFFEINE PRN TAB: 50; 325; 40 TABLET ORAL at 14:52

## 2019-09-21 RX ADMIN — HEPARIN SODIUM SCH UNIT: 5000 INJECTION, SOLUTION INTRAVENOUS; SUBCUTANEOUS at 20:40

## 2019-09-21 RX ADMIN — VANCOMYCIN HYDROCHLORIDE SCH MLS/HR: 1.25 INJECTION, POWDER, LYOPHILIZED, FOR SOLUTION INTRAVENOUS at 05:00

## 2019-09-21 NOTE — NUR
Pt was found in room with eyes closed and snoring, with IV cannula outside of body hanging 
by tape. Patient stated that he rolls in his sleep and must have pulled on it. Patient 
apologized and stated worries about finishing IV antibiotic therapy without an IV. Multiple 
attempts by surgical floor nurses and ICU charge to re-establish IV access failed.

## 2019-09-21 NOTE — NUR
Pt c/o feeling like he may have a low BG. Accucheck performed=88. Gave pt a snack as 
requested ie cheese stick, crackers & lane crackers.

## 2019-09-21 NOTE — NUR
Problems reprioritized. Patient report given, questions answered & plan of care reviewed 
with Renu RN.

## 2019-09-21 NOTE — NUR
Patient in room [facility]. I have received report from TANA Strauss and had the opportunity to 
ask questions and assume patient care.

## 2019-09-21 NOTE — NUR
Patient in room [facility]. I have received report from Renu FAJARDO and had the opportunity to 
ask questions and assume patient care.

## 2019-09-21 NOTE — NUR
Problems reprioritized. Patient report given, questions answered & plan of care reviewed 
with TANA Strauss.

## 2019-09-22 VITALS — SYSTOLIC BLOOD PRESSURE: 132 MMHG | DIASTOLIC BLOOD PRESSURE: 78 MMHG

## 2019-09-22 VITALS — SYSTOLIC BLOOD PRESSURE: 154 MMHG | DIASTOLIC BLOOD PRESSURE: 89 MMHG

## 2019-09-22 VITALS — DIASTOLIC BLOOD PRESSURE: 86 MMHG | SYSTOLIC BLOOD PRESSURE: 147 MMHG

## 2019-09-22 RX ADMIN — HYDROCODONE BITARTRATE AND ACETAMINOPHEN PRN TAB: 5; 325 TABLET ORAL at 14:04

## 2019-09-22 RX ADMIN — HEPARIN SODIUM SCH UNIT: 5000 INJECTION, SOLUTION INTRAVENOUS; SUBCUTANEOUS at 07:46

## 2019-09-22 RX ADMIN — PANTOPRAZOLE SODIUM SCH MG: 40 TABLET, DELAYED RELEASE ORAL at 07:44

## 2019-09-22 RX ADMIN — INSULIN LISPRO SCH UNITS: 100 INJECTION, SOLUTION INTRAVENOUS; SUBCUTANEOUS at 08:45

## 2019-09-22 RX ADMIN — HYDROCODONE BITARTRATE AND ACETAMINOPHEN PRN TAB: 5; 325 TABLET ORAL at 07:46

## 2019-09-22 RX ADMIN — BUTALBITAL, ACETAMINOPHEN, AND CAFFEINE PRN TAB: 50; 325; 40 TABLET ORAL at 08:48

## 2019-09-22 RX ADMIN — VANCOMYCIN HYDROCHLORIDE SCH MLS/HR: 1.25 INJECTION, POWDER, LYOPHILIZED, FOR SOLUTION INTRAVENOUS at 05:08

## 2019-09-22 RX ADMIN — Medication SCH MMU: at 07:43

## 2019-09-22 NOTE — NUR
DC inst provided to pt. IV DC'd, tip intact. All belongings sent w/pt. PCT ambulated pt to 
front lobby.

## 2019-10-30 ENCOUNTER — HOSPITAL ENCOUNTER (INPATIENT)
Dept: HOSPITAL 94 - ER | Age: 49
Discharge: HOME | DRG: 351 | End: 2019-10-30
Attending: EMERGENCY MEDICINE | Admitting: FAMILY MEDICINE
Payer: MEDICAID

## 2019-10-30 VITALS — HEIGHT: 69 IN | BODY MASS INDEX: 29.68 KG/M2 | WEIGHT: 200.42 LBS

## 2019-10-30 VITALS — SYSTOLIC BLOOD PRESSURE: 204 MMHG | DIASTOLIC BLOOD PRESSURE: 96 MMHG

## 2019-10-30 DIAGNOSIS — E11.42: ICD-10-CM

## 2019-10-30 DIAGNOSIS — Z88.1: ICD-10-CM

## 2019-10-30 DIAGNOSIS — Z76.5: ICD-10-CM

## 2019-10-30 DIAGNOSIS — R47.81: ICD-10-CM

## 2019-10-30 DIAGNOSIS — G89.29: ICD-10-CM

## 2019-10-30 DIAGNOSIS — M79.10: Primary | ICD-10-CM

## 2019-10-30 DIAGNOSIS — Z86.73: ICD-10-CM

## 2019-10-30 DIAGNOSIS — M54.9: ICD-10-CM

## 2019-10-30 DIAGNOSIS — Z88.0: ICD-10-CM

## 2019-10-30 LAB
ALBUMIN SERPL BCP-MCNC: 3.6 G/DL (ref 3.4–5)
ALBUMIN/GLOB SERPL: 0.9 {RATIO} (ref 1.1–1.5)
ALP SERPL-CCNC: 69 IU/L (ref 46–116)
ALT SERPL W P-5'-P-CCNC: 18 U/L (ref 12–78)
ANION GAP SERPL CALCULATED.3IONS-SCNC: 10 MMOL/L (ref 8–16)
APTT PPP: 22 SECONDS (ref 22–32)
AST SERPL W P-5'-P-CCNC: 18 U/L (ref 10–37)
BACTERIA URNS QL MICRO: (no result) /HPF
BASOPHILS # BLD AUTO: 0.1 X10'3 (ref 0–0.2)
BASOPHILS NFR BLD AUTO: 1.2 % (ref 0–1)
BILIRUB SERPL-MCNC: 0.2 MG/DL (ref 0.1–1)
BUN SERPL-MCNC: 9 MG/DL (ref 7–18)
BUN/CREAT SERPL: 9.2 (ref 5.4–32)
CALCIUM SERPL-MCNC: 8.7 MG/DL (ref 8.5–10.1)
CHLORIDE SERPL-SCNC: 101 MMOL/L (ref 99–107)
CHOLEST SERPL-MCNC: 136 MG/DL (ref 0–200)
CHOLEST/HDLC SERPL: 2.7 {RATIO} (ref 0–4.99)
CLARITY UR: CLEAR
CO2 SERPL-SCNC: 26.2 MMOL/L (ref 24–32)
COLOR UR: YELLOW
CREAT SERPL-MCNC: 0.98 MG/DL (ref 0.6–1.1)
DEPRECATED SQUAMOUS URNS QL MICRO: (no result) /LPF
EOSINOPHIL # BLD AUTO: 0.2 X10'3 (ref 0–0.9)
EOSINOPHIL NFR BLD AUTO: 2.3 % (ref 0–6)
ERYTHROCYTE [DISTWIDTH] IN BLOOD BY AUTOMATED COUNT: 16.4 % (ref 11.5–14.5)
GFR SERPL CREATININE-BSD FRML MDRD: 81 ML/MIN
GLUCOSE SERPL-MCNC: 392 MG/DL (ref 70–104)
GLUCOSE UR STRIP-MCNC: >=1000 MG/DL
HCT VFR BLD AUTO: 34 % (ref 42–52)
HDLC SERPL-MCNC: 51 MG/DL (ref 35–60)
HGB BLD-MCNC: 11.4 G/DL (ref 14–17.9)
HGB UR QL STRIP: NEGATIVE
KETONES UR STRIP-MCNC: NEGATIVE MG/DL
LDLC SERPL DIRECT ASSAY-MCNC: 80 MG/DL (ref 50–100)
LEUKOCYTE ESTERASE UR QL STRIP: NEGATIVE
LYMPHOCYTES # BLD AUTO: 2 X10'3 (ref 1.1–4.8)
LYMPHOCYTES NFR BLD AUTO: 25.1 % (ref 21–51)
MCH RBC QN AUTO: 28.6 PG (ref 27–31)
MCHC RBC AUTO-ENTMCNC: 33.6 G/DL (ref 33–36.5)
MCV RBC AUTO: 85.1 FL (ref 78–98)
MONOCYTES # BLD AUTO: 0.7 X10'3 (ref 0–0.9)
MONOCYTES NFR BLD AUTO: 8.3 % (ref 2–12)
NEUTROPHILS # BLD AUTO: 5 X10'3 (ref 1.8–7.7)
NEUTROPHILS NFR BLD AUTO: 63.1 % (ref 42–75)
NITRITE UR QL STRIP: NEGATIVE
PH UR STRIP: 6 [PH] (ref 4.8–8)
PLATELET # BLD AUTO: 223 X10'3 (ref 140–440)
PMV BLD AUTO: 9 FL (ref 7.4–10.4)
POTASSIUM SERPL-SCNC: 4.4 MMOL/L (ref 3.5–5.1)
PROT SERPL-MCNC: 7.5 G/DL (ref 6.4–8.2)
PROT UR QL STRIP: NEGATIVE MG/DL
RBC # BLD AUTO: 3.99 X10'6 (ref 4.7–6.1)
RBC #/AREA URNS HPF: (no result) /HPF (ref 0–2)
SODIUM SERPL-SCNC: 137 MMOL/L (ref 135–145)
SP GR UR STRIP: <=1.005 (ref 1–1.03)
TRIGL SERPL-MCNC: 74 MG/DL (ref 20–135)
TROPONIN I SERPL-MCNC: < 0.04 NG/ML (ref 0–0.05)
URN COLLECT METHOD CLASS: (no result)
UROBILINOGEN UR STRIP-MCNC: 0.2 E.U/DL (ref 0.2–1)
WBC # BLD AUTO: 7.9 X10'3 (ref 4.5–11)
WBC #/AREA URNS HPF: (no result) /HPF (ref 0–4)

## 2019-10-30 PROCEDURE — 83605 ASSAY OF LACTIC ACID: CPT

## 2019-10-30 PROCEDURE — 84145 PROCALCITONIN (PCT): CPT

## 2019-10-30 PROCEDURE — 70551 MRI BRAIN STEM W/O DYE: CPT

## 2019-10-30 PROCEDURE — 80061 LIPID PANEL: CPT

## 2019-10-30 PROCEDURE — 70450 CT HEAD/BRAIN W/O DYE: CPT

## 2019-10-30 PROCEDURE — 85025 COMPLETE CBC W/AUTO DIFF WBC: CPT

## 2019-10-30 PROCEDURE — 70544 MR ANGIOGRAPHY HEAD W/O DYE: CPT

## 2019-10-30 PROCEDURE — 99285 EMERGENCY DEPT VISIT HI MDM: CPT

## 2019-10-30 PROCEDURE — 71045 X-RAY EXAM CHEST 1 VIEW: CPT

## 2019-10-30 PROCEDURE — 85610 PROTHROMBIN TIME: CPT

## 2019-10-30 PROCEDURE — 36415 COLL VENOUS BLD VENIPUNCTURE: CPT

## 2019-10-30 PROCEDURE — 80053 COMPREHEN METABOLIC PANEL: CPT

## 2019-10-30 PROCEDURE — 82948 REAGENT STRIP/BLOOD GLUCOSE: CPT

## 2019-10-30 PROCEDURE — 85730 THROMBOPLASTIN TIME PARTIAL: CPT

## 2019-10-30 PROCEDURE — 93005 ELECTROCARDIOGRAM TRACING: CPT

## 2019-10-30 PROCEDURE — 87040 BLOOD CULTURE FOR BACTERIA: CPT

## 2019-10-30 PROCEDURE — 81001 URINALYSIS AUTO W/SCOPE: CPT

## 2019-10-30 PROCEDURE — 84484 ASSAY OF TROPONIN QUANT: CPT

## 2019-10-30 NOTE — NUR
PT OBSERVED PICKING UP A LARGE BOTTLE OF WATER WITH HIS RIGHT HAND AND BROUGHT 
UP TO HIS MOUTH WITHOUT DIFFICULTY.

## 2019-10-30 NOTE — NUR
PT TOLD BY MD HE COULD BE DISCHARGED, PT BECAME UPSET SAYING HE NEEDED PAIN 
MEDICATION. PT GOT UP AND STEADILY GOT DRESSED BY HIMSELF WITHOUT INCIDENT, 
PACING AROUND THE ROOM.

## 2019-10-30 NOTE — NUR
Pt states his symptoms began 3 days ago. Pt has been eating and drinking 
without difficulty over the last 3 days. Pt has water with him in room. Pt 
states he feels like the right side of his face is swollen. Pt extends tongue 
and swallowing secretions without difficulty. Pt given small sip of water 
through straw with HOB at 90 degrees. Pt swallowed water without difficulty or 
coughing. Medication given without incident.